# Patient Record
Sex: FEMALE | Race: WHITE
[De-identification: names, ages, dates, MRNs, and addresses within clinical notes are randomized per-mention and may not be internally consistent; named-entity substitution may affect disease eponyms.]

---

## 2020-07-17 ENCOUNTER — HOSPITAL ENCOUNTER (OUTPATIENT)
Dept: HOSPITAL 47 - RADMRIMAIN | Age: 45
Discharge: HOME | End: 2020-07-17
Payer: COMMERCIAL

## 2020-07-17 DIAGNOSIS — M48.061: Primary | ICD-10-CM

## 2020-07-17 DIAGNOSIS — M51.36: ICD-10-CM

## 2020-07-17 PROCEDURE — 72148 MRI LUMBAR SPINE W/O DYE: CPT

## 2020-07-17 NOTE — MR
EXAMINATION TYPE: MR lumbar spine wo con

 

DATE OF EXAM: 7/17/2020 1:55 PM

 

COMPARISON: NONE

 

HISTORY: LBP, BLE radic x 6 mos

 

Multiplanar, MultiSpin echo imaging of the lumbar spine was performed.

 

L1-L2: Normal disc appearance without desiccation.  No herniation, protrusion or disc bulging.  No ca
nal stenosis is present.  Foramina are patent bilaterally.  

 

L2-L3: Normal disc appearance without desiccation.  No herniation, protrusion or disc bulging.  No ca
nal stenosis is present.  Foramina are patent bilaterally. 

 

L3-L4: Moderate degenerative disc desiccation with moderate posterior disc bulge. Hypertrophy of the 
ligamentum flavum with facet joint arthropathy resulting in moderate central stenosis. Mild bilateral
 foraminal encroachment. 

 

L4-L5: Moderate degenerative disc desiccation with moderate posterior disc bulge. Hypertrophy of the 
ligamentum flavum with facet joint arthropathy . Right lateral recess stenosis without central stenos
is. 

 

L5-S1: Normal disc appearance without desiccation.  No herniation, protrusion or disc bulging.  No ca
nal stenosis is present.  Foramina are patent bilaterally.  

 

Lumbar segments are intact.  No paraspinal masses are identified.  Conus medullaris has a normal appe
arance. T12 hemangioma.

 

IMPRESSION:

 

1. Degenerative disc disease at L3-4 and L4-5. Moderate central stenosis at L3-4 as noted above.

## 2021-09-08 ENCOUNTER — HOSPITAL ENCOUNTER (OUTPATIENT)
Dept: HOSPITAL 47 - PNWHC3 | Age: 46
End: 2021-09-08
Attending: ANESTHESIOLOGY
Payer: COMMERCIAL

## 2021-09-08 VITALS
RESPIRATION RATE: 18 BRPM | DIASTOLIC BLOOD PRESSURE: 92 MMHG | SYSTOLIC BLOOD PRESSURE: 155 MMHG | HEART RATE: 85 BPM | TEMPERATURE: 98.2 F

## 2021-09-08 DIAGNOSIS — M51.36: ICD-10-CM

## 2021-09-08 DIAGNOSIS — M47.816: Primary | ICD-10-CM

## 2021-09-08 DIAGNOSIS — Z79.899: ICD-10-CM

## 2021-09-08 DIAGNOSIS — F17.200: ICD-10-CM

## 2021-09-08 DIAGNOSIS — I10: ICD-10-CM

## 2021-09-08 DIAGNOSIS — M19.90: ICD-10-CM

## 2021-09-08 DIAGNOSIS — M48.061: ICD-10-CM

## 2021-09-08 PROCEDURE — 99211 OFF/OP EST MAY X REQ PHY/QHP: CPT

## 2021-09-08 NOTE — P.PAINCN
History of Present Illness





- Reason for Consult


Consult date: 21





- History of Present Illness


At this is 45 years old female with a chronic history of severe low back pain 

started 3 years ago, she denies any initiating event, denies any history of 

trauma or accident heavy lifting, he reported that the pain is constant and 

increases with any activity interfered with the quality of life, she had 

transforaminal epidural steroid injection 2 without any benefit, she reported 

that her pain is constant but mainly in the low back area, she denies any fever 

or night sweats she denies any motor or sensory deficit, she denies any change 

in the bowel movements or urination, patient had physical therapy done in the 

past without any benefit and she is doing chiropractors and the last visit was a

few weeks ago, she tried pain medication ibuprofen and Zanaflex and gabapentin 

without any significant improvement of her pain





Past Medical History


Past Medical History: Hypertension, Musculoskeletal Disorder, Osteoarthritis 

(OA)


Additional Past Medical History / Comment(s): sciatic pain, 3 bulging discs, 

DDD, finishing antibiotic for tonsillitis


History of Any Multi-Drug Resistant Organisms: None Reported


Past Surgical History:  Section


Additional Past Surgical History / Comment(s): ovarian cyst removed


Past Anesthesia/Blood Transfusion Reactions: No Reported Reaction


Smoking Status: Current every day smoker


Past Alcohol Use History: Occasional


Additional Past Alcohol Use History / Comment(s): 1ppd since teens


Past Drug Use History: Marijuana


Additional Drug Use History / Comment(s): occasional use





Medications and Allergies


                                Home Medications











 Medication  Instructions  Recorded  Confirmed  Type


 


Amoxic-Pot Clav 875-125Mg 1 tab PO Q12HR 21 History





[Augmentin 875-125]    


 


Ergocalciferol [Vitamin D2 (1250 1,250 mcg PO WEEKLY 21 History





Mcg = 05494 Iu)]    


 


Gabapentin [Neurontin] 100 mg PO BID PRN 21 History


 


Ibuprofen [Motrin] 800 mg PO Q8H PRN 21 History


 


Losartan Potassium [Cozaar] 100 mg PO DAILY 21 History


 


Oxybutynin Chloride [Ditropan XL] 10 mg PO DAILY 21 History


 


amLODIPine BESYLATE 5 mg PO DAILY 21 History


 


buPROPion HCL [buPROPion HCL XL] 450 mg PO DAILY 21 History


 


tiZANidine [Zanaflex] 4 mg PO Q6HR PRN 21 History








                                    Allergies











Allergy/AdvReac Type Severity Reaction Status Date / Time


 


No Known Allergies Allergy   Verified 21 15:51














Physical Exam


Vitals: 


                                   Vital Signs











  Temp Pulse Resp BP Pulse Ox


 


 21 09:50  98.2 F  85  18  155/92  97











    


   Physical Examinations  :


    -Constitutiona       : Cooperative , not in acute distress .


    -HEENT                :  nech :  supple ,  no Lymphadenopathy  , normal  

thyroid  size .


                               :   eyes  :  no ptosis , no icterus,  no 

photophobia .                                                                   

                                                                                

                                                                                

                                                                                

                                                                   


    - neurologic         :   Cranial nerve II   to  XII  intact ,  no   focal 

neurological deffecit  .


    -psychatric          : alert ,  oriented  X 3  ,   appropriate affect   , 

intact judgment  and insight  .  


    -Lymphatic          :    no Lymphadenopathy .


   - musculoskeltal   :     


                                   Lumber spine


                                         moter stegnth lower extremities ,thigh 

and legs  5/5 Right side ,  5/5  Left side 


                                         deep tendon reflexes :   normal  Knee 

Jerk    , normal   ankle Jerk  


                                         lumber facet Loading Test =positive 

Right , positive Left 


                                         Range of motion of the lumbar spine  

Flexion  30 degrees,   extension   10 degrees


                                         strait leg raising test = negative 

bilaterally   


                                          Fabere test= negative bilaterally.


                                          tenderness over the  Sacroiliac joint 

 on the Right , and Left  sides   


                               





Results


Comments: 


MRI of the lumbar spine= multilevel lumbar degenerative disc disease and 

multilevel lumbar facet arthropathy and central canal stenosis at L3 4








Assessment and Plan


Plan: 


Assessment and plan=1-lumbar spondylosis with lumbar facet arthropathy without 

myelopathy.


                               2-lumbar degenerative disc disease.


                               3-Lumbar spinal stenosis.


                               She had transforaminal epidural steroid injection

 without any benefit.


                               Description B good candidate to have diagnostic 

medial branch block lumbar area at L3, L4, L5, Bilaterally and possible RFA


Time with Patient: Greater than 30





PQRS Measure Charge Sheet


Measure #130: Documentation of Current Meds in Medical Chart: Patient's 

medications documented in chart


Measure #226: Tobacco Use: Screen & Cessation Intervention: Pt not a tobacco 

user


Measure #111: Pneumonia Vaccination: Pneumococcal vaccine NOT administered or 

previously given


Measure #47: Advance Care Plan: Advance care planning discussed & documented, pt

 chose/unable to give


Measure #412: Opioid Treatment Agreement: No documentation of signed opioid 

treatment agreement


Measure #408: Opioid Therapy Follow-up Evaluation: Patient had NO f/u eval 

minimum every 3 months during opioid therapy


Measure #317: Preventitive Care & Scrn High Bld Press & F/U: Pre-hypertensive or

 hypertensive BP documented, pt will f/u with PCP


Measure #128: Body Mass Index (BMI) Screening & Follow-up: BMI documented ABOVE 

normal parameters - f/u documented


Measure #131: Pain Assessment & Follow-up: Pain positive & plan documented, 

Follow-up scheduled


Measure #431: Unhealthy Alcohol Use Preventative Care & Scrn: Patient not 

identified as an unhealthy alcohol user


PQRS Narrative: 


                                        





Blood Pressure                   155/92


Pain Intensity [Back]            7


Scale Used                       Numeric (1 - 10)


Hx Alcohol Use (MH)              Yes








Home Medications: 


Ambulatory Orders





Amoxic-Pot Clav 875-125Mg [Augmentin 875-125] 1 tab PO Q12HR 21 


Ergocalciferol [Vitamin D2 (1250 Mcg = 69536 Iu)] 1,250 mcg PO WEEKLY 21 


Gabapentin [Neurontin] 100 mg PO BID PRN 21 


Ibuprofen [Motrin] 800 mg PO Q8H PRN 21 


Losartan Potassium [Cozaar] 100 mg PO DAILY 21 


Oxybutynin Chloride [Ditropan XL] 10 mg PO DAILY 21 


amLODIPine BESYLATE 5 mg PO DAILY 21 


buPROPion HCL [buPROPion HCL XL] 450 mg PO DAILY 21 


tiZANidine [Zanaflex] 4 mg PO Q6HR PRN 21

## 2021-11-05 ENCOUNTER — HOSPITAL ENCOUNTER (OUTPATIENT)
Dept: HOSPITAL 47 - ORPAIN | Age: 46
Discharge: HOME | End: 2021-11-05
Attending: ANESTHESIOLOGY
Payer: COMMERCIAL

## 2021-11-05 VITALS — HEART RATE: 68 BPM | SYSTOLIC BLOOD PRESSURE: 112 MMHG | DIASTOLIC BLOOD PRESSURE: 74 MMHG

## 2021-11-05 VITALS — BODY MASS INDEX: 25.8 KG/M2

## 2021-11-05 VITALS — TEMPERATURE: 97.5 F | RESPIRATION RATE: 16 BRPM

## 2021-11-05 DIAGNOSIS — M47.816: Primary | ICD-10-CM

## 2021-11-05 DIAGNOSIS — G89.29: ICD-10-CM

## 2021-11-05 PROCEDURE — 81025 URINE PREGNANCY TEST: CPT

## 2021-11-05 PROCEDURE — 64493 INJ PARAVERT F JNT L/S 1 LEV: CPT

## 2021-11-05 PROCEDURE — 64494 INJ PARAVERT F JNT L/S 2 LEV: CPT

## 2021-11-05 PROCEDURE — 64495 INJ PARAVERT F JNT L/S 3 LEV: CPT

## 2021-11-05 NOTE — P.PCN
Date of Procedure: 11/05/21


Description of Procedure: 


Pre- and Post-operative Diagnosis: Lumbar facet arthropathy, and lumbar spon

dylosis without myelopathy.





Procedure:  #1 Diagnostic Medial Branch Block at bilateral Lumbar 4/5


                   and #1 diagnostic dorsal ramus block at Lumbar 5/ sacral ala 

levels (total 4 levels)








Surgeon: Pilo Rodriguez





Anesthesia: Local: 1% Lidocaine, 


                                IV sedation : Versed and fentanyl. 





Complications: None





EBL: None





Specimen removed: None





Fluoroscopic image: Saved to patient electronic  medical records.





Indications for Procedure: The patient is well known to pain clinic for his 

chronic low back pain management. The lumbar facet loading test was positive 

with a clinical diagnosis of lumbar facet arthropathy.  Failed with conservative

therapy.  Came here for interventional help for better pain relief. 





Procedure and Findings: The patient was seen and examined. The written informed 

consent was obtained after explaining the risks, benefits and alternatives of 

the procedure to the patient. The patient was brought to the procedure room and 

was placed in the prone position on the operating table table.  A pillow was 

placed under the abdomen to reduce lumbar lordosis.  Standard anesthesia 

monitoring was done through out the procedure.  The skin preparation was done 

with ChloraPrep, and draping was done in usual sterile fashion.  Sterile 

technique was observed throughout the procedure. 





Under fluoroscopic guidance, right the Lumbar  4, 5 and Sacral ala levels were 

identified in the AP view.  For lumbar L4, and L5 levels the targeting area of 

superior articular process, and close to the most medial and superior aspect of 

transverse process identified, marked.  1ml of  1% Lidocaine was used with a 25 

gauge needle to achieve adequate local anesthesia of the skin and subcutaneous 

tissue at each level.  A 22 gauge 3.5 inch spinal needle was placed and advanced

targeting area which was close to the most medial and superior aspect of the 

transverse process. For Lumbar 5/ sacral ala level, fluoroscope was used in the 

anteroposterior view, and the needle tip was placed at the superior and most 

medial part of sacral ala close to the superior articular process.  A bony 

contact was obtained and needle tip position was confirmed at anteroposterior 

view.  No paresthesia was noted.  A negative aspiration was confirmed.  1 ml 

solution per level was injected, the block solution containing 5 ml of 0.5% 

ropivacaine preservative-free solution mixed with 40 MG of Depo-Medrol. The 

needles were removed intact.





Entire procedure repeated on the left side.





Lumbar area was cleaned and bandages were applied. 





Disposition : The patient tolerated the procedure very well.  The patient was 

transferred to the recovery room and remained stable until discharged home. The 

patient was given detailed discharge instructions for infection, bleeding, and 

increased pain at the injection site, and was advised to seek immediate medical 

attention should significant side effects develop.  The patient will be 

scheduled with Pain Clinic within 4 weeks  for repeat procedure if it's helpful.

## 2021-11-05 NOTE — FL
EXAMINATION TYPE: FL guided pain mgmt statistic

 

DATE OF EXAM: 11/5/2021

 

HISTORY: Fluoroscopy  time

 

4 seconds of fluoroscopy provided. 

 

IMPRESSION:

1. Fluoroscopy time.

## 2021-11-29 ENCOUNTER — HOSPITAL ENCOUNTER (OUTPATIENT)
Dept: HOSPITAL 47 - PNWHC3 | Age: 46
End: 2021-11-29
Attending: ANESTHESIOLOGY
Payer: COMMERCIAL

## 2021-11-29 VITALS
SYSTOLIC BLOOD PRESSURE: 117 MMHG | DIASTOLIC BLOOD PRESSURE: 80 MMHG | TEMPERATURE: 98.1 F | RESPIRATION RATE: 18 BRPM | HEART RATE: 90 BPM

## 2021-11-29 DIAGNOSIS — M51.36: ICD-10-CM

## 2021-11-29 DIAGNOSIS — M48.061: ICD-10-CM

## 2021-11-29 DIAGNOSIS — M47.816: Primary | ICD-10-CM

## 2021-11-29 PROCEDURE — 99211 OFF/OP EST MAY X REQ PHY/QHP: CPT

## 2021-11-29 NOTE — P.PN
Progress Note - Text


Progress Note Date: 11/29/21





 This is  follow up visit for 46 years old female with a chronic history of 

severe low back pain started 3 years ago, she denies any initiating event, 

denies any history of trauma or accident heavy lifting, he reported that the 

pain is constant and increases with any activity interfered with the quality of 

life, she had transforaminal epidural steroid injection 2 without any benefit, 

recently we did agnostic medial branch block she has no benefit from it , pain 

was the same before the block and the same after the block she reported that her

pain is constant but mainly in the low back area, she denies any fever or night 

sweats she denies any motor or sensory deficit, she denies any change in the 

bowel movements or urination, patient had physical therapy done in the past 

without any benefit and she is doing chiropractors and the last visit was a few 

weeks ago, she tried pain medication ibuprofen and Zanaflex and gabapentin 

without any significant improvement of her pain








    


   Physical Examinations  :


    -Constitutiona       : Cooperative , not in acute distress .


    -HEENT                :  nech :  supple ,  no Lymphadenopathy  , normal  

thyroid  size .


                               :   eyes  :  no ptosis , no icterus,  no 

photophobia .                                                                   

                                                                                

                                                                                

                                                                                

                                                               


    - neurologic         :   Cranial nerve II   to  XII  intact ,  no   focal 

neurological deffecit  .


    -psychatric          : alert ,  oriented  X 3  ,   appropriate affect   , 

intact judgment  and insight  .  


    -Lymphatic          :    no Lymphadenopathy .


   - musculoskeltal   :     


                                   Lumber spine


                                         moter stegnth lower extremities ,thigh 

and legs  5/5 Right side ,  5/5  Left side 


                                         deep tendon reflexes :   normal  Knee 

Jerk    , normal   ankle Jerk  


                                         lumber facet Loading Test =positive 

Right , positive Left 


                                         Range of motion of the lumbar spine  

Flexion  30 degrees,   extension   10 degrees


                                         strait leg raising test = negative 

bilaterally   


                                          Fabere test= negative bilaterally.


                                          tenderness over the  Sacroiliac joint 

on the Right , and Left  sides   


                               





Results


MRI of the lumbar spine= multilevel lumbar degenerative disc disease and 

multilevel lumbar facet arthropathy and central canal stenosis at L3 4





Assessment and plan=1-lumbar spondylosis with lumbar facet arthropathy without 

myelopathy.


                               2-lumbar degenerative disc disease.


                               3-Lumbar spinal stenosis.


                               She had transforaminal epidural steroid injection

without any benefit.


                               she had  negative result with the diagnostic 

medial branch block lumbar area at L3, L4, L5, 


                               Patient will follow up with Dr. Toscano for 

evaluation for possible surgical interventions


Time with Patient: less than 30








- PQRS measures  =


        - Patient's medications are documented in the chart.


         -Tobacco use is positive, and counseling.Given.


         -Patient's has not received pneumococcal vaccine.


         -Advanced care planning discussed, patient not eligible.


         -Opiate contract not signed.


         -Pain positive and follow-up visit/procedure is scheduled.


         -Patient's blood pressure measured [  117/80]  , and documented in the 

record ,and patient will follow up with the primary care.


         -Patient's weight was measured and body mass index [  ]within the  

normal limits and counseling was done.  and patient instructed to follow-up with

the primary care physician.


         -Patient was not identified as an unhealthy alcohol user

## 2022-03-01 ENCOUNTER — HOSPITAL ENCOUNTER (OUTPATIENT)
Dept: HOSPITAL 47 - ORWHC2ENDO | Age: 47
Discharge: HOME | End: 2022-03-01
Attending: INTERNAL MEDICINE
Payer: COMMERCIAL

## 2022-03-01 VITALS — DIASTOLIC BLOOD PRESSURE: 87 MMHG | HEART RATE: 50 BPM | SYSTOLIC BLOOD PRESSURE: 154 MMHG

## 2022-03-01 VITALS — RESPIRATION RATE: 16 BRPM | TEMPERATURE: 97.4 F

## 2022-03-01 VITALS — BODY MASS INDEX: 24.9 KG/M2

## 2022-03-01 DIAGNOSIS — Z79.1: ICD-10-CM

## 2022-03-01 DIAGNOSIS — Z79.899: ICD-10-CM

## 2022-03-01 DIAGNOSIS — K21.9: ICD-10-CM

## 2022-03-01 DIAGNOSIS — E78.5: ICD-10-CM

## 2022-03-01 DIAGNOSIS — K29.50: Primary | ICD-10-CM

## 2022-03-01 DIAGNOSIS — Z98.890: ICD-10-CM

## 2022-03-01 DIAGNOSIS — I10: ICD-10-CM

## 2022-03-01 PROCEDURE — 81025 URINE PREGNANCY TEST: CPT

## 2022-03-01 PROCEDURE — 45378 DIAGNOSTIC COLONOSCOPY: CPT

## 2022-03-01 PROCEDURE — 43239 EGD BIOPSY SINGLE/MULTIPLE: CPT

## 2022-03-01 PROCEDURE — 88305 TISSUE EXAM BY PATHOLOGIST: CPT

## 2022-03-01 NOTE — P.PCN
Date of Procedure: 03/01/22


Procedure(s) Performed: 


Brief history:


Patient is a pleasant 46-year-old white female scheduled for an elective upper 

endoscopy as well as colonoscopy as a part of evaluation of GERD/abdominal pain 

and change in bowel habits for the last several years duration.  She has 

intermittent episodes of diffuse abdominal pain for the last 20 years.  These 

episodes happens once or twice a year and last for several hours and resolved.  

In between episodes patient is asymptomatic.





Procedure performed:


Esophagogastroduodenoscopy with biopsy


Colonoscopy





Preoperative diagnosis: GERD/abdominal pain and change in bowel habits





Anesthesia: MAC





Procedure:


After informed consent was obtained from the patient  was brought into the 

endoscopy unit and IV  sedation was administered by anesthesia under continuous 

monitoring.  Initially upper endoscopy was done.  The Olympus  video 

endoscope was inserted inserted into the mouth and esophagus intubated without 

any difficulty and was gradually advanced into the stomach and duodenum and 

carefully examined.  The bulb and second part of the duodenum had mild duod

enitis and biopsies were done from this area..  The scope was then withdrawn 

into the stomach adequately insufflated with air and upon careful examination  

the antrum had scattered areas of erythema which was biopsied.  The  body, 

cardia and fundus appeared normal.  The scope was then withdrawn into the 

esophagus.  The GE junction was located at 40 cm to the incisors.  It appeared 

regular with no erythema erosions or ulcerations.  Rest of the esophagus 

appeared normal.  Patient tolerated the procedure well.





At this time the patient continued to remain sedation.  Initial digital rectal 

examination was normal.  Olympus  video colonoscope was then inserted into

the rectum and gradually advanced to the cecum without any difficulty.  Careful 

examination was performed as the scope was gradually being withdrawn.  The prep 

was excellent.  The cecum, ascending colon, transverse colon, descending colon, 

sigmoid colon and rectum appeared normal.  Retroflexion was performed in the 

rectum and no lesions were noted.  Patient tolerated the procedure well.





Impression:


1.  Upper endoscopy revealed mild antral gastritis and duodenitis but no 

evidence of peptic ulcer 


2.  Colonoscopy was within normal limits with no evidence of colitis or colon 

rectal neoplasia.








Recommendations:


Findings of this examination were discussed with the patient as well as her 

family.  She was advised to follow with the biopsy results.  He'll be seen in 

office in 3-4 weeks.  Recommended a repeat screening colonoscopy in 10 years

## 2022-03-12 ENCOUNTER — HOSPITAL ENCOUNTER (EMERGENCY)
Dept: HOSPITAL 47 - EC | Age: 47
Discharge: HOME | End: 2022-03-12
Payer: COMMERCIAL

## 2022-03-12 VITALS — TEMPERATURE: 96.9 F

## 2022-03-12 VITALS — DIASTOLIC BLOOD PRESSURE: 80 MMHG | RESPIRATION RATE: 18 BRPM | HEART RATE: 65 BPM | SYSTOLIC BLOOD PRESSURE: 145 MMHG

## 2022-03-12 DIAGNOSIS — R10.30: Primary | ICD-10-CM

## 2022-03-12 DIAGNOSIS — F17.200: ICD-10-CM

## 2022-03-12 DIAGNOSIS — I10: ICD-10-CM

## 2022-03-12 DIAGNOSIS — Z79.899: ICD-10-CM

## 2022-03-12 LAB
ALBUMIN SERPL-MCNC: 4.4 G/DL (ref 3.5–5)
ALP SERPL-CCNC: 77 U/L (ref 38–126)
ALT SERPL-CCNC: 21 U/L (ref 4–34)
ANION GAP SERPL CALC-SCNC: 10 MMOL/L
AST SERPL-CCNC: 24 U/L (ref 14–36)
BASOPHILS # BLD AUTO: 0 K/UL (ref 0–0.2)
BASOPHILS NFR BLD AUTO: 0 %
BUN SERPL-SCNC: 10 MG/DL (ref 7–17)
CALCIUM SPEC-MCNC: 9.5 MG/DL (ref 8.4–10.2)
CHLORIDE SERPL-SCNC: 108 MMOL/L (ref 98–107)
CO2 SERPL-SCNC: 20 MMOL/L (ref 22–30)
EOSINOPHIL # BLD AUTO: 0.1 K/UL (ref 0–0.7)
EOSINOPHIL NFR BLD AUTO: 1 %
ERYTHROCYTE [DISTWIDTH] IN BLOOD BY AUTOMATED COUNT: 4.58 M/UL (ref 3.8–5.4)
ERYTHROCYTE [DISTWIDTH] IN BLOOD: 14 % (ref 11.5–15.5)
GLUCOSE SERPL-MCNC: 139 MG/DL (ref 74–99)
HCT VFR BLD AUTO: 42 % (ref 34–46)
HGB BLD-MCNC: 13.9 GM/DL (ref 11.4–16)
LIPASE SERPL-CCNC: 94 U/L (ref 23–300)
LYMPHOCYTES # SPEC AUTO: 1.4 K/UL (ref 1–4.8)
LYMPHOCYTES NFR SPEC AUTO: 12 %
MCH RBC QN AUTO: 30.4 PG (ref 25–35)
MCHC RBC AUTO-ENTMCNC: 33.2 G/DL (ref 31–37)
MCV RBC AUTO: 91.7 FL (ref 80–100)
MONOCYTES # BLD AUTO: 0.6 K/UL (ref 0–1)
MONOCYTES NFR BLD AUTO: 5 %
NEUTROPHILS # BLD AUTO: 9.6 K/UL (ref 1.3–7.7)
NEUTROPHILS NFR BLD AUTO: 81 %
PH UR: 8.5 [PH] (ref 5–8)
PLATELET # BLD AUTO: 311 K/UL (ref 150–450)
POTASSIUM SERPL-SCNC: 3.5 MMOL/L (ref 3.5–5.1)
PROT SERPL-MCNC: 7.6 G/DL (ref 6.3–8.2)
RBC UR QL: 1 /HPF (ref 0–5)
SODIUM SERPL-SCNC: 138 MMOL/L (ref 137–145)
SP GR UR: 1.01 (ref 1–1.03)
SQUAMOUS UR QL AUTO: 1 /HPF (ref 0–4)
UROBILINOGEN UR QL STRIP: <2 MG/DL (ref ?–2)
WBC # BLD AUTO: 11.8 K/UL (ref 3.8–10.6)
WBC # UR AUTO: 3 /HPF (ref 0–5)

## 2022-03-12 PROCEDURE — 80053 COMPREHEN METABOLIC PANEL: CPT

## 2022-03-12 PROCEDURE — 96376 TX/PRO/DX INJ SAME DRUG ADON: CPT

## 2022-03-12 PROCEDURE — 96375 TX/PRO/DX INJ NEW DRUG ADDON: CPT

## 2022-03-12 PROCEDURE — 36415 COLL VENOUS BLD VENIPUNCTURE: CPT

## 2022-03-12 PROCEDURE — 81001 URINALYSIS AUTO W/SCOPE: CPT

## 2022-03-12 PROCEDURE — 96361 HYDRATE IV INFUSION ADD-ON: CPT

## 2022-03-12 PROCEDURE — 96374 THER/PROPH/DIAG INJ IV PUSH: CPT

## 2022-03-12 PROCEDURE — 83605 ASSAY OF LACTIC ACID: CPT

## 2022-03-12 PROCEDURE — 74177 CT ABD & PELVIS W/CONTRAST: CPT

## 2022-03-12 PROCEDURE — 83690 ASSAY OF LIPASE: CPT

## 2022-03-12 PROCEDURE — 99284 EMERGENCY DEPT VISIT MOD MDM: CPT

## 2022-03-12 PROCEDURE — 85025 COMPLETE CBC W/AUTO DIFF WBC: CPT

## 2022-03-12 NOTE — ED
Abdominal Pain HPI





- General


Chief Complaint: Abdominal Pain


Stated Complaint: Abd pain


Time Seen by Provider: 22 08:20


Source: patient, EMS


Mode of arrival: EMS


Limitations: no limitations





- History of Present Illness


Initial Comments: 


46-year-old female patient presents to the emergency department today for 

evaluation of recurrent abdominal pain.  States this episode started this 

morning after having a bowel movement.  She is reporting pain mostly to the 

right lower quadrant region.  States she does have low back pain.  States she 

gets pain episodes like this at least twice a week.  She did have recent 

colonoscopy with Dr. Rainey without any findings.  States no one has been able to

find a cause for her pain.  She denies hematochezia, melena, or hematemesis.  

Denies any hematuria, dysuria, urinary frequency, urinary urgency.  Did have cy

st removed from her ovary approximately 14 years ago.  No further abdominal 

surgeries.  She has not taken anything for her pain.   Patient denies any recent

rash, fever, chills, cough, shortness of breath, chest pain, diarrhea, 

constipation, numbness, tingling, dizziness, weakness, headache, visual changes,

or any other complaints.








- Related Data


                                Home Medications











 Medication  Instructions  Recorded  Confirmed


 


Ergocalciferol [Vitamin D2 (1250 1,250 mcg PO MARTE 21





Mcg = 54346 Iu)]   


 


Gabapentin [Neurontin] 300 mg PO BID PRN 21


 


Ibuprofen [Motrin] 800 mg PO Q8H PRN 21


 


Oxybutynin Chloride [Ditropan XL] 10 mg PO DAILY 21


 


amLODIPine BESYLATE 10 mg PO QAM 21


 


tiZANidine [Zanaflex] 4 mg PO Q6HR PRN 21


 


Benazepril HCl 20 mg PO QAM 22


 


DULoxetine HCL [Cymbalta] 30 mg PO DAILY 22


 


Omeprazole 40 mg PO DAILY 22


 


Hyoscyamine Sulfate [Levsin] 0.125 mg PO AS DIRECTED PRN 22











                                    Allergies











Allergy/AdvReac Type Severity Reaction Status Date / Time


 


No Known Allergies Allergy   Verified 22 08:27














Review of Systems


ROS Statement: 


Those systems with pertinent positive or pertinent negative responses have been 

documented in the HPI.





ROS Other: All systems not noted in ROS Statement are negative.





Past Medical History


Past Medical History: Hypertension, Osteoarthritis (OA)


Additional Past Medical History / Comment(s): IRRITABLE BOWEL SYNDROME, BACK 

PAIN, hx Covid 22(loss of taste).


History of Any Multi-Drug Resistant Organisms: None Reported


Past Surgical History:  Section


Additional Past Surgical History / Comment(s): CYST REMOVED FROM [facility]

ROTOMY.


Past Anesthesia/Blood Transfusion Reactions: No Reported Reaction


Past Psychological History: Depression


Smoking Status: Current every day smoker


Past Alcohol Use History: Occasional


Past Drug Use History: Marijuana





- Past Family History


  ** Mother


Family Medical History: No Reported History





General Exam


Limitations: no limitations


General appearance: alert, in no apparent distress, other (This is a well-

developed, well-nourished adult female in mild distress related to pain.)


ENT exam: Present: normal exam, normal oropharynx, mucous membranes moist


Respiratory exam: Present: normal lung sounds bilaterally.  Absent: respiratory 

distress, wheezes, rales, rhonchi, stridor


Cardiovascular Exam: Present: regular rate, normal rhythm, normal heart sounds. 

 Absent: systolic murmur, diastolic murmur, rubs, gallop, clicks


GI/Abdominal exam: Present: soft, tenderness (Right lower quadrant), normal 

bowel sounds.  Absent: distended, guarding, rebound, rigid


Neurological exam: Present: alert, oriented X3, CN II-XII intact


Psychiatric exam: Present: normal affect, normal mood


Skin exam: Present: warm, dry, intact, normal color.  Absent: rash





Course


                                   Vital Signs











  22





  08:21


 


Temperature 96.9 F L


 


Pulse Rate 64


 


Respiratory 20





Rate 


 


Blood Pressure 170/95


 


O2 Sat by Pulse 100





Oximetry 














Medical Decision Making





- Medical Decision Making


46-year-old female patient presented for evaluation of lower abdominal pain that

 started this morning after a bowel movement.  She does have history of chronic 

abdominal pain with episodes similar to this 2 times weekly.  Physical 

examination did reveal right lower quadrant tenderness.  Labs reviewed and did 

reveal white blood cell count 11.8 with neutrophils at 9.6.  Blood glucose 139. 

 Lactic acid 2.3.  She was given IV fluids and pain medication.  Upon 

reevaluation she is resting more complete though still having pain.  CT abdomen 

and pelvis was obtained and was negative for any acute abnormalities.  I did 

discuss findings and results with her.  She'll be given additional pain 

medication dosage, starter pack for home.  She is instructed to follow-up with 

her primary care physician and she was given recommendation for general surgery 

due to her chronic pain.  Return parameters were discussed in detail.  She 

verbalizes understanding and agrees with this plan.  My attending is Dr. Alfonso.








- Lab Data


Result diagrams: 


                                 22 09:12





                                 22 09:12


                                   Lab Results











  22 Range/Units





  09:12 09:12 09:12 


 


WBC  11.8 H    (3.8-10.6)  k/uL


 


RBC  4.58    (3.80-5.40)  m/uL


 


Hgb  13.9    (11.4-16.0)  gm/dL


 


Hct  42.0    (34.0-46.0)  %


 


MCV  91.7    (80.0-100.0)  fL


 


MCH  30.4    (25.0-35.0)  pg


 


MCHC  33.2    (31.0-37.0)  g/dL


 


RDW  14.0    (11.5-15.5)  %


 


Plt Count  311    (150-450)  k/uL


 


MPV  7.7    


 


Neutrophils %  81    %


 


Lymphocytes %  12    %


 


Monocytes %  5    %


 


Eosinophils %  1    %


 


Basophils %  0    %


 


Neutrophils #  9.6 H    (1.3-7.7)  k/uL


 


Lymphocytes #  1.4    (1.0-4.8)  k/uL


 


Monocytes #  0.6    (0-1.0)  k/uL


 


Eosinophils #  0.1    (0-0.7)  k/uL


 


Basophils #  0.0    (0-0.2)  k/uL


 


Sodium    138  (137-145)  mmol/L


 


Potassium    3.5  (3.5-5.1)  mmol/L


 


Chloride    108 H  ()  mmol/L


 


Carbon Dioxide    20 L  (22-30)  mmol/L


 


Anion Gap    10  mmol/L


 


BUN    10  (7-17)  mg/dL


 


Creatinine    0.60  (0.52-1.04)  mg/dL


 


Est GFR (CKD-EPI)AfAm    >90  (>60 ml/min/1.73 sqM)  


 


Est GFR (CKD-EPI)NonAf    >90  (>60 ml/min/1.73 sqM)  


 


Glucose    139 H  (74-99)  mg/dL


 


Plasma Lactic Acid Derian     (0.7-2.0)  mmol/L


 


Calcium    9.5  (8.4-10.2)  mg/dL


 


Total Bilirubin    0.5  (0.2-1.3)  mg/dL


 


AST    24  (14-36)  U/L


 


ALT    21  (4-34)  U/L


 


Alkaline Phosphatase    77  ()  U/L


 


Total Protein    7.6  (6.3-8.2)  g/dL


 


Albumin    4.4  (3.5-5.0)  g/dL


 


Lipase    94  ()  U/L


 


Urine Color   Light Yellow   


 


Urine Appearance   Cloudy H   (Clear)  


 


Urine pH   8.5 H   (5.0-8.0)  


 


Ur Specific Gravity   1.014   (1.001-1.035)  


 


Urine Protein   Negative   (Negative)  


 


Urine Glucose (UA)   Negative   (Negative)  


 


Urine Ketones   Negative   (Negative)  


 


Urine Blood   Negative   (Negative)  


 


Urine Nitrite   Negative   (Negative)  


 


Urine Bilirubin   Negative   (Negative)  


 


Urine Urobilinogen   <2.0   (<2.0)  mg/dL


 


Ur Leukocyte Esterase   Negative   (Negative)  


 


Urine RBC   1   (0-5)  /hpf


 


Urine WBC   3   (0-5)  /hpf


 


Ur Squamous Epith Cells   1   (0-4)  /hpf


 


Urine Bacteria   Rare H   (None)  /hpf


 


Urine Mucus   Rare H   (None)  /hpf














  22 Range/Units





  09:12 


 


WBC   (3.8-10.6)  k/uL


 


RBC   (3.80-5.40)  m/uL


 


Hgb   (11.4-16.0)  gm/dL


 


Hct   (34.0-46.0)  %


 


MCV   (80.0-100.0)  fL


 


MCH   (25.0-35.0)  pg


 


MCHC   (31.0-37.0)  g/dL


 


RDW   (11.5-15.5)  %


 


Plt Count   (150-450)  k/uL


 


MPV   


 


Neutrophils %   %


 


Lymphocytes %   %


 


Monocytes %   %


 


Eosinophils %   %


 


Basophils %   %


 


Neutrophils #   (1.3-7.7)  k/uL


 


Lymphocytes #   (1.0-4.8)  k/uL


 


Monocytes #   (0-1.0)  k/uL


 


Eosinophils #   (0-0.7)  k/uL


 


Basophils #   (0-0.2)  k/uL


 


Sodium   (137-145)  mmol/L


 


Potassium   (3.5-5.1)  mmol/L


 


Chloride   ()  mmol/L


 


Carbon Dioxide   (22-30)  mmol/L


 


Anion Gap   mmol/L


 


BUN   (7-17)  mg/dL


 


Creatinine   (0.52-1.04)  mg/dL


 


Est GFR (CKD-EPI)AfAm   (>60 ml/min/1.73 sqM)  


 


Est GFR (CKD-EPI)NonAf   (>60 ml/min/1.73 sqM)  


 


Glucose   (74-99)  mg/dL


 


Plasma Lactic Acid Derian  2.3 H*  (0.7-2.0)  mmol/L


 


Calcium   (8.4-10.2)  mg/dL


 


Total Bilirubin   (0.2-1.3)  mg/dL


 


AST   (14-36)  U/L


 


ALT   (4-34)  U/L


 


Alkaline Phosphatase   ()  U/L


 


Total Protein   (6.3-8.2)  g/dL


 


Albumin   (3.5-5.0)  g/dL


 


Lipase   ()  U/L


 


Urine Color   


 


Urine Appearance   (Clear)  


 


Urine pH   (5.0-8.0)  


 


Ur Specific Gravity   (1.001-1.035)  


 


Urine Protein   (Negative)  


 


Urine Glucose (UA)   (Negative)  


 


Urine Ketones   (Negative)  


 


Urine Blood   (Negative)  


 


Urine Nitrite   (Negative)  


 


Urine Bilirubin   (Negative)  


 


Urine Urobilinogen   (<2.0)  mg/dL


 


Ur Leukocyte Esterase   (Negative)  


 


Urine RBC   (0-5)  /hpf


 


Urine WBC   (0-5)  /hpf


 


Ur Squamous Epith Cells   (0-4)  /hpf


 


Urine Bacteria   (None)  /hpf


 


Urine Mucus   (None)  /hpf














Disposition


Clinical Impression: 


 Abdominal pain





Disposition: HOME SELF-CARE


Condition: Good


Instructions (If sedation given, give patient instructions):  Abdominal Pain 

(ED)


Additional Instructions: 


Follow up with your primary care physician for recheck in 1-2 days.  Return for 

any new, worsening, or concerning symptoms.


Is patient prescribed a controlled substance at d/c from ED?: No


Referrals: 


Jason Ruiz MD [Primary Care Provider] - 1-2 days


Usman Johnson DO [Doctor of Osteopathic Medicine] - 1-2 days


Time of Disposition: 10:29

## 2022-03-12 NOTE — CT
EXAMINATION TYPE: CT abdomen pelvis w con

 

DATE OF EXAM: 3/12/2022

 

COMPARISON: None

 

HISTORY: RLQ pain

 

CT DLP: 716 mGycm

Automated exposure control for dose reduction was used.

 

TECHNIQUE:  Helical acquisition of images was performed from the lung bases through the pelvis.

 

CONTRAST: 

Performed without Oral Contrast and with IV Contrast, patient injected with 100 mL of Isovue 300.

 

FINDINGS: 

 

The lung bases are clear.

 

Gallbladder is normal without wall thickening, gallstones, pericholecystic fluid or distention. There
 is no biliary ductal dilatation.

 

No focal organomegaly involving the liver, pancreas, spleen or adrenal glands.

 

The kidneys excrete contrast promptly and symmetrically and there is no solid renal mass or hydroneph
rosis. There is no retroperitoneal adenopathy or hemorrhage in the caliber of the abdominal aorta is 
normal.

 

The bowel loops are normal in caliber and there is no evidence of obstruction. No inflammatory change
s are identified in the bowel wall or mesentery and there is no free intraperitoneal air or fluid.

 

There is no pelvic mass, free fluid, abscess or adenopathy.

 

The osseous structures are intact.

 

IMPRESSION:

 

No significant abnormality seen.